# Patient Record
Sex: FEMALE | Race: WHITE
[De-identification: names, ages, dates, MRNs, and addresses within clinical notes are randomized per-mention and may not be internally consistent; named-entity substitution may affect disease eponyms.]

---

## 2018-09-21 ENCOUNTER — HOSPITAL ENCOUNTER (EMERGENCY)
Dept: HOSPITAL 59 - ER | Age: 78
Discharge: ANOTHER HOSPITAL | End: 2018-09-21
Payer: MEDICARE

## 2018-09-21 DIAGNOSIS — R41.0: ICD-10-CM

## 2018-09-21 DIAGNOSIS — R11.2: ICD-10-CM

## 2018-09-21 DIAGNOSIS — R56.9: Primary | ICD-10-CM

## 2018-09-21 DIAGNOSIS — F17.210: ICD-10-CM

## 2018-09-21 DIAGNOSIS — E11.9: ICD-10-CM

## 2018-09-21 DIAGNOSIS — I10: ICD-10-CM

## 2018-09-21 LAB
ALBUMIN SERPL-MCNC: 4.1 G/DL (ref 4–5)
ALBUMIN/GLOB SERPL: 1.6 {RATIO} (ref 1.1–1.8)
ALP SERPL-CCNC: 56 U/L (ref 35–104)
ALT SERPL-CCNC: 8 U/L (ref ?–33)
ANION GAP SERPL CALC-SCNC: 18 MMOL/L (ref 7–16)
APPEARANCE UR: CLEAR
AST SERPL-CCNC: 12 U/L (ref 10–35)
BARBITURATE SCREEN URINE: NOT DETECTED
BASOPHILS NFR BLD: 0 % (ref 0–6)
BENZODIAZEPINE SCREEN URINE: NOT DETECTED
BILIRUB SERPL-MCNC: 0.2 MG/DL (ref 0.2–1)
BILIRUB UR-MCNC: NEGATIVE MG/DL
BUN SERPL-MCNC: 15 MG/DL (ref 8–23)
CARDIOLIPIN IGM SER IA-ACNC: NOT DETECTED
CARDIOLIPIN IGM SER IA-ACNC: NOT DETECTED
CO2 SERPL-SCNC: 22 MMOL/L (ref 22–29)
COLOR UR: YELLOW
CREAT SERPL-MCNC: 0.7 MG/DL (ref 0.5–0.9)
CREATINE PHOSPHOKINASE: 61 U/L (ref 26–192)
EOSINOPHIL NFR BLD: 0 % (ref 0–6)
ERYTHROCYTE [DISTWIDTH] IN BLOOD BY AUTOMATED COUNT: 13.7 % (ref 11.5–14.5)
EST GLOMERULAR FILTRATION RATE: > 60 ML/MIN
GLOBULIN SER-MCNC: 2.6 GM/DL (ref 1.4–4.8)
GLUCOSE SERPL-MCNC: 150 MG/DL (ref 74–109)
GLUCOSE UR STRIP-MCNC: NEGATIVE MG/DL
HCT VFR BLD CALC: 40.7 % (ref 35–47)
HGB BLD-MCNC: 13.4 GM/DL (ref 11.6–16)
KETONES UR QL STRIP: (no result)
LYMPHOCYTES NFR BLD: 42 % (ref 16–45)
MCH RBC QN AUTO: 31.1 PG (ref 27–33)
MCHC RBC AUTO-ENTMCNC: 32.9 G/DL (ref 32–36)
MCV RBC AUTO: 94.4 FL (ref 81–97)
METHADONE SCREEN URINE: NOT DETECTED
MONOCYTES NFR BLD: 7 % (ref 0–9)
NEUTROPHILS NFR BLD AUTO: 51 % (ref 47–80)
NEUTS BAND NFR BLD: 0 % (ref 0–5)
NITRITE UR QL STRIP: NEGATIVE
OPIATE SCREEN URINE: NOT DETECTED
PF4 HEPARIN CMPLX AB SER-ACNC: NOT DETECTED
PF4 HEPARIN CMPLX AB SER-ACNC: NOT DETECTED
PHENCYCLIDINE SCREEN URINE: NOT DETECTED
PLATELET # BLD: 256 K/UL (ref 130–400)
PMV BLD AUTO: 12.4 FL (ref 7.4–10.4)
PROPOXYPHENE SCREEN URINE: NOT DETECTED
PROT SERPL-MCNC: 6.7 G/DL (ref 6.6–8.7)
PROT UR QL STRIP: NEGATIVE
RBC # BLD AUTO: 4.31 M/UL (ref 3.8–5.4)
RBC # UR STRIP: NEGATIVE /UL
THC SCREEN URINE: NOT DETECTED
TRICYCLIC ANTIDEPRESSANT SCRN: NOT DETECTED
URINE LEUKOCYTE ESTERASE: NEGATIVE
UROBILINOGEN UR STRIP-ACNC: 0.2 E.U./DL (ref 0.2–1)
WBC # BLD AUTO: 12.3 K/UL (ref 4.2–12.2)

## 2018-09-21 PROCEDURE — 93010 ELECTROCARDIOGRAM REPORT: CPT

## 2018-09-21 PROCEDURE — 80053 COMPREHEN METABOLIC PANEL: CPT

## 2018-09-21 PROCEDURE — 82550 ASSAY OF CK (CPK): CPT

## 2018-09-21 PROCEDURE — 85027 COMPLETE CBC AUTOMATED: CPT

## 2018-09-21 PROCEDURE — 80305 DRUG TEST PRSMV DIR OPT OBS: CPT

## 2018-09-21 PROCEDURE — 96365 THER/PROPH/DIAG IV INF INIT: CPT

## 2018-09-21 PROCEDURE — 99285 EMERGENCY DEPT VISIT HI MDM: CPT

## 2018-09-21 PROCEDURE — 81003 URINALYSIS AUTO W/O SCOPE: CPT

## 2018-09-21 PROCEDURE — 93005 ELECTROCARDIOGRAM TRACING: CPT

## 2018-09-21 PROCEDURE — 70450 CT HEAD/BRAIN W/O DYE: CPT

## 2018-09-21 NOTE — EMERGENCY DEPARTMENT RECORD
History of Present Illness





- General


Chief complaint: Vomiting


Stated complaint: VOMITING


Time Seen by Provider: 18 15:05


Source: Patient, Family, EMS, Old records reviewed


Mode of Arrival: EMS


Limitations: Altered mental status





- History of Present Illness


Initial comments: 


pt brought in after she had what appeared to be a seizure in front of her 

.  it lasted a few minutes and then pt had a lengthy time where she was 

oriented only to self and was very confused.  she also vomited a few times.


MD complaint: Nausea, Vomiting


Onset/Timin


-: Hour(s)


Description of Vomiting: Bilious


Severity: Moderate


Severity scale (1-10): 2


Quality: Aching


Consistency: Intermittent


Associated Symptoms: Nausea/vomiting





- Related Data


 Allergies











Allergy/AdvReac Type Severity Reaction Status Date / Time


 


codeine Allergy  PT UNSURE Verified 18 14:53





   OF REACTION  














Travel Screening





- Travel/Exposure Within Last 30 Days


Have you traveled within the last 30 days?: No





- Travel/Exposure Within Last Year


Have you traveled outside the [place] in the last year?: No





- Additonal Travel Details


Have you been exposed to anyone with a communicable illness?: No





- Travel Symptoms


Symptom Screening: None





Review of Systems


Reviewed: No additional complaints except as noted below


Constitutional: Reports: As per HPI, Weakness.  Denies: Chills, Fever, Malaise, 

Night sweats, Weight change


Eyes: Reports: As per HPI.  Denies: Eye discharge, Eye pain, Photophobia, 

Vision change


ENT: Reports: As per HPI.  Denies: Congestion, Dental pain, Ear pain, Epistaxis

, Hearing loss, Throat pain


Respiratory: Reports: As per HPI.  Denies: Cough, Dyspnea, Hemoptysis, Stridor, 

Wheezes


Cardiovascular: Reports: As per HPI.  Denies: Arrhythmia, Chest pain, Dyspnea 

on exertion, Edema, Murmurs, Orthopnea, Palpitations, Paroxysmal nocturnal 

dyspnea, Rheumatic Fever, Syncope


Endocrine: Reports: As per HPI.  Denies: Fatigue, Heat or cold intolerance, 

Polydipsia, Polyuria


Gastrointestinal: Reports: As per HPI.  Denies: Abdominal pain, Constipation, 

Diarrhea, Hematemesis, Hematochezia, Melena, Nausea, Vomiting


Genitourinary: Reports: As per HPI.  Denies: Abnormal menses, Discharge, 

Dyspareunia, Dysuria, Frequency, Hematuria, Incontinence, Retention, Urgency


Musculoskeletal: Reports: As per HPI.  Denies: Arthralgia, Back pain, Gout, 

Joint swelling, Myalgia, Neck pain


Skin: Reports: As per HPI.  Denies: Bruising, Change in color, Change in hair/

nails, Lesions, Pruritus, Rash


Neurological: Reports: As per HPI, Confusion, Seizure.  Denies: Abnormal gait, 

Headache, Numbness, Paresthesias, Tingling, Tremors, Vertigo, Weakness


Psychiatric: Reports: As per HPI.  Denies: Anxiety, Auditory hallucinations, 

Depression, Homicidal thoughts, Suicidal thoughts, Visual hallucinations


Hematological/Lymphatic: Reports: As per HPI.  Denies: Anemia, Blood Clots, 

Easy bleeding, Easy bruising, Swollen glands





Past Medical History





- SOCIAL HISTORY


Smoking Status: Current every day smoker


Alcohol Use: None


Drug Use: None





- RESPIRATORY


Hx Respiratory Disorders: No





- CARDIOVASCULAR


Hx Cardio Disorders: Yes


Hx Hypertension: Yes





- NEURO


Hx Neuro Disorders: No





- GI


Hx GI Disorders: No





- 


Hx Genitourinary Disorders: No





- ENDOCRINE


Hx Endocrine Disorders: Yes


Hx Diabetes: Yes (type 2)





- MUSCULOSKELETAL


Hx Musculoskeletal Disorders: No





- PSYCH


Hx Psych Problems: No





- HEMATOLOGY/ONCOLOGY


Hx Hematology/Oncology Disorders: No





Family Medical History


Any Significant Family History?: Yes





Physical Exam





- General


General Appearance: Alert, Cooperative, Mild distress





- Head


Head exam: Normal inspection





- Eye


Eye exam: Normal appearance, PERRL, EOMI


Pupils: Normal accommodation





- ENT


ENT exam: Normal exam, Mucous membranes moist, Normal external ear exam, Normal 

orophraynx


Ear exam: Normal external inspection.  negative: External canal tenderness


Nasal Exam: Normal inspection.  negative: Discharge, Sinus tenderness


Mouth exam: Normal external inspection, Tongue normal


Teeth exam: Normal inspection.  negative: Dental caries


Throat exam: Normal inspection.  negative: Tonsillar erythema, Tonsillar exudate





- Neck


Neck exam: Normal inspection, Full ROM.  negative: Tenderness





- Respiratory


Respiratory exam: Normal lung sounds bilaterally.  negative: Respiratory 

distress





- Cardiovascular


Cardiovascular Exam: Regular rate, Normal rhythm, Normal heart sounds





- GI/Abdominal


GI/Abdominal exam: Soft, Normal bowel sounds.  negative: Tenderness





- Rectal


Rectal exam: Deferred





- 


 exam: Deferred





- Extremities


Extremities exam: Normal inspection, Full ROM, Normal capillary refill.  

negative: Tenderness





- Back


Back exam: Reports: Normal inspection, Full ROM.  Denies: Muscle spasm, Rash 

noted, Tenderness





- Neurological


Neurological exam: Alert, Altered, CN II-XII intact, Normal gait





- Psychiatric


Psychiatric exam: Normal affect, Normal mood





- Skin


Skin exam: Dry, Intact, Normal color, Warm





Course





 Vital Signs











  18





  14:46


 


Temperature 97.5 F L


 


Pulse Rate 81


 


Respiratory 20





Rate 


 


Blood Pressure 140/79


 


Pulse Ox 95














- Reevaluation(s)


Reevaluation #1: 





18 18:04


pt still confused.  alert and Ox2. she does not know year thinks its  then 

.  daughter  expresses concern re pts mental status.





Medical Decision Making





- Lab Data


Result diagrams: 


 18 14:25





 18 14:25





 Lab Results











  18 Range/Units





  14:25 14:25 17:00 


 


WBC  12.3 H    (4.2-12.2)  K/uL


 


RBC  4.31    (3.80-5.40)  M/uL


 


Hgb  13.4    (11.6-16.0)  gm/dl


 


Hct  40.7    (35.0-47.0)  %


 


MCV  94.4    (81-97)  fl


 


MCH  31.1    (27-33)  pg


 


MCHC  32.9    (32-36)  g/dl


 


RDW  13.7    (11.5-14.5)  %


 


Plt Count  256    (130-400)  K/uL


 


MPV  12.4 H    (7.4-10.4)  fl


 


Neutrophils %  51.0    (47-80)  %


 


Band Neutrophils %  0.0    (0-5)  %


 


Eosinophils %  Not Reportable    


 


Basophils %  Not Reportable    


 


Lymphocytes  42.0    (16-45)  %


 


Monocytes  7.0    (0-9)  %


 


Basophils  0.0    (0-6)  %


 


Eosinophil Count  0.0    (0-6)  %


 


Sodium   136   (136-145)  mmol/L


 


Potassium   4.5   (3.4-4.5)  mmol/L


 


Chloride   96 L   ()  mmol/L


 


Carbon Dioxide   22.0   (22-29)  mmol/L


 


Anion Gap   18.0 H   (7-16)  


 


BUN   15   (8-23)  mg/dL


 


Creatinine   0.7   (0.5-0.9)  mg/dL


 


Estimated GFR   > 60   mL/min


 


Random Glucose   150 H   ()  mg/dL


 


Calcium   9.3   (8.8-10.2)  mg/dL


 


Total Bilirubin   0.20   (0.2-1.0)  mg/dL


 


AST   12   (10.0-35.0)  U/L


 


ALT   8   (<33)  U/L


 


Alkaline Phosphatase   56   ()  U/L


 


Creatine Kinase   61   ()  U/L


 


Total Protein   6.7   (6.6-8.7)  g/dL


 


Albumin   4.1   (4.0-5.0)  g/dL


 


Globulin   2.6   (1.4-4.8)  gm/dL


 


Albumin/Globulin Ratio   1.6   (1.1-1.8)  


 


Urine Color    Yellow  


 


Urine Appearance    Clear  


 


Urine pH    5.5  (5.0-8.0)  


 


Ur Specific Gravity    >= 1.030  (1.002-1.030)  


 


Urine Protein    Negative  (NEGATIVE)  


 


Urine Glucose (UA)    Negative  (NEGATIVE)  


 


Urine Ketones    15 mg/dl H  (NEGATIVE)  


 


Urine Blood    Negative  (NEGATIVE)  


 


Urine Nitrite    Negative  (NEGATIVE)  


 


Urine Bilirubin    Negative  (NEGATIVE)  


 


Urine Urobilinogen    0.2  (0.20 - 1.00)  E.U./dL


 


Ur Leukocyte Esterase    Negative  (NEGATIVE)  


 


Urine Opiates Screen     


 


Ur Oxycodone Screen     


 


Urine Methadone Screen     


 


Ur Propoxyphene Screen     


 


Ur Barbituates Screen     


 


Ur Tricyclics Screen     


 


Ur Phencyclidine Scrn     


 


Ur Amphetamine Screen     


 


U Methamphetamines Scrn     


 


U Benzodiazepines Scrn     


 


Urine Cocaine Screen     


 


Urine Cannabis Screen     














  18 Range/Units





  17:00 


 


WBC   (4.2-12.2)  K/uL


 


RBC   (3.80-5.40)  M/uL


 


Hgb   (11.6-16.0)  gm/dl


 


Hct   (35.0-47.0)  %


 


MCV   (81-97)  fl


 


MCH   (27-33)  pg


 


MCHC   (32-36)  g/dl


 


RDW   (11.5-14.5)  %


 


Plt Count   (130-400)  K/uL


 


MPV   (7.4-10.4)  fl


 


Neutrophils %   (47-80)  %


 


Band Neutrophils %   (0-5)  %


 


Eosinophils %   


 


Basophils %   


 


Lymphocytes   (16-45)  %


 


Monocytes   (0-9)  %


 


Basophils   (0-6)  %


 


Eosinophil Count   (0-6)  %


 


Sodium   (136-145)  mmol/L


 


Potassium   (3.4-4.5)  mmol/L


 


Chloride   ()  mmol/L


 


Carbon Dioxide   (22-29)  mmol/L


 


Anion Gap   (7-16)  


 


BUN   (8-23)  mg/dL


 


Creatinine   (0.5-0.9)  mg/dL


 


Estimated GFR   mL/min


 


Random Glucose   ()  mg/dL


 


Calcium   (8.8-10.2)  mg/dL


 


Total Bilirubin   (0.2-1.0)  mg/dL


 


AST   (10.0-35.0)  U/L


 


ALT   (<33)  U/L


 


Alkaline Phosphatase   ()  U/L


 


Creatine Kinase   ()  U/L


 


Total Protein   (6.6-8.7)  g/dL


 


Albumin   (4.0-5.0)  g/dL


 


Globulin   (1.4-4.8)  gm/dL


 


Albumin/Globulin Ratio   (1.1-1.8)  


 


Urine Color   


 


Urine Appearance   


 


Urine pH   (5.0-8.0)  


 


Ur Specific Gravity   (1.002-1.030)  


 


Urine Protein   (NEGATIVE)  


 


Urine Glucose (UA)   (NEGATIVE)  


 


Urine Ketones   (NEGATIVE)  


 


Urine Blood   (NEGATIVE)  


 


Urine Nitrite   (NEGATIVE)  


 


Urine Bilirubin   (NEGATIVE)  


 


Urine Urobilinogen   (0.20 - 1.00)  E.U./dL


 


Ur Leukocyte Esterase   (NEGATIVE)  


 


Urine Opiates Screen  Not detected  


 


Ur Oxycodone Screen  Not detected  


 


Urine Methadone Screen  Not detected  


 


Ur Propoxyphene Screen  Not detected  


 


Ur Barbituates Screen  Not detected  


 


Ur Tricyclics Screen  Not detected  


 


Ur Phencyclidine Scrn  Not detected  


 


Ur Amphetamine Screen  Not detected  


 


U Methamphetamines Scrn  Not detected  


 


U Benzodiazepines Scrn  Not detected  


 


Urine Cocaine Screen  Not detected  


 


Urine Cannabis Screen  Not detected  














Disposition


Disposition: Transfer


Clinical Impression: 


 Seizure, Disoriented to time





Disposition: Acute Care Hospital Transfer


Transfer To: [facility]


Reason For Transfer: seizure


Accepting Physician: dr loving


Time Discussed w/Accepting Physician: 18:12





Quality





- Quality Measures


Quality Measures: N/A





- Blood Pressure Screening


Does Patient Have Any of the Following: No


Blood Pressure Classification: Hypertensive Reading


Systolic Measurement: 140


Diastolic Measurement: 79


Screening for High Blood Pressure: < Pre-Hypertensive BP, F/U Documented > [

]


Pre-Hypertensive Follow-up Interventions: Follow-up with rescreen every year.

## 2018-09-24 NOTE — CT SCAN REPORT
EXAM:  CT SCAN OF THE HEAD



HISTORY:  PATIENT HAS A HISTORY OF POSSIBLE SYNCOPE OR SEIZURES.  FOUND ON THE 
FLOOR.



TECHNIQUE:  Serial axial CT scan of the head was performed at 2.5 mm intervals 
from the base of the skull to the apex without the use of intravenous contrast.
  



Comparison:  CT scan of the head dated 6/25/16 is provided.  



FINDINGS:  Moderate generalized parenchymal volume loss is noted.  There is no 
mass or mass effect.  Moderate periventricular and subcortical white matter 
chronic small vessel ischemic changes are identified.  There is no CT evidence 
of intra or extraaxial fluid to suggest bleeding.  



Bone windows demonstrate no CT evidence for a fracture or dislocation of the 
skull.  The paranasal sinuses are unremarkable.  



IMPRESSION:  

STABLE CT APPEARANCE OF THE BRAIN WITH RESPECT TO THE PRIOR EXAMINATION.  



JOB NUMBER:  [id number]
[facility]